# Patient Record
Sex: MALE | Race: BLACK OR AFRICAN AMERICAN
[De-identification: names, ages, dates, MRNs, and addresses within clinical notes are randomized per-mention and may not be internally consistent; named-entity substitution may affect disease eponyms.]

---

## 2019-12-19 ENCOUNTER — HOSPITAL ENCOUNTER (INPATIENT)
Dept: HOSPITAL 74 - YASAS | Age: 59
LOS: 4 days | Discharge: OTHER INSTITUTION | DRG: 897 | End: 2019-12-23
Attending: ALLERGY & IMMUNOLOGY | Admitting: ALLERGY & IMMUNOLOGY
Payer: COMMERCIAL

## 2019-12-19 VITALS — BODY MASS INDEX: 28.5 KG/M2

## 2019-12-19 DIAGNOSIS — F17.213: ICD-10-CM

## 2019-12-19 DIAGNOSIS — Z87.438: ICD-10-CM

## 2019-12-19 DIAGNOSIS — I10: ICD-10-CM

## 2019-12-19 DIAGNOSIS — J45.909: ICD-10-CM

## 2019-12-19 DIAGNOSIS — Z59.0: ICD-10-CM

## 2019-12-19 DIAGNOSIS — F32.9: ICD-10-CM

## 2019-12-19 DIAGNOSIS — F14.20: ICD-10-CM

## 2019-12-19 DIAGNOSIS — F19.24: ICD-10-CM

## 2019-12-19 DIAGNOSIS — F12.20: ICD-10-CM

## 2019-12-19 DIAGNOSIS — F10.230: Primary | ICD-10-CM

## 2019-12-19 DIAGNOSIS — Z91.018: ICD-10-CM

## 2019-12-19 DIAGNOSIS — Z91.19: ICD-10-CM

## 2019-12-19 DIAGNOSIS — Z62.810: ICD-10-CM

## 2019-12-19 DIAGNOSIS — Z86.11: ICD-10-CM

## 2019-12-19 PROCEDURE — HZ2ZZZZ DETOXIFICATION SERVICES FOR SUBSTANCE ABUSE TREATMENT: ICD-10-PCS | Performed by: ALLERGY & IMMUNOLOGY

## 2019-12-19 RX ADMIN — Medication SCH MG: at 22:00

## 2019-12-19 RX ADMIN — Medication PRN MG: at 23:16

## 2019-12-19 SDOH — ECONOMIC STABILITY - HOUSING INSECURITY: HOMELESSNESS: Z59.0

## 2019-12-20 LAB
ALBUMIN SERPL-MCNC: 3.1 G/DL (ref 3.4–5)
ALP SERPL-CCNC: 66 U/L (ref 45–117)
ALT SERPL-CCNC: 20 U/L (ref 13–61)
ANION GAP SERPL CALC-SCNC: 6 MMOL/L (ref 8–16)
AST SERPL-CCNC: 29 U/L (ref 15–37)
BILIRUB SERPL-MCNC: 0.3 MG/DL (ref 0.2–1)
BUN SERPL-MCNC: 19.1 MG/DL (ref 7–18)
CALCIUM SERPL-MCNC: 8.4 MG/DL (ref 8.5–10.1)
CHLORIDE SERPL-SCNC: 107 MMOL/L (ref 98–107)
CO2 SERPL-SCNC: 29 MMOL/L (ref 21–32)
CREAT SERPL-MCNC: 1.2 MG/DL (ref 0.55–1.3)
DEPRECATED RDW RBC AUTO: 14.9 % (ref 11.9–15.9)
GLUCOSE SERPL-MCNC: 77 MG/DL (ref 74–106)
HCT VFR BLD CALC: 38.9 % (ref 35.4–49)
HGB BLD-MCNC: 12.6 GM/DL (ref 11.7–16.9)
MCH RBC QN AUTO: 30.2 PG (ref 25.7–33.7)
MCHC RBC AUTO-ENTMCNC: 32.3 G/DL (ref 32–35.9)
MCV RBC: 93.4 FL (ref 80–96)
PLATELET # BLD AUTO: 234 K/MM3 (ref 134–434)
PMV BLD: 9.3 FL (ref 7.5–11.1)
POTASSIUM SERPLBLD-SCNC: 4 MMOL/L (ref 3.5–5.1)
PROT SERPL-MCNC: 5.8 G/DL (ref 6.4–8.2)
RBC # BLD AUTO: 4.16 M/MM3 (ref 4–5.6)
SODIUM SERPL-SCNC: 142 MMOL/L (ref 136–145)
WBC # BLD AUTO: 4.8 K/MM3 (ref 4–10)

## 2019-12-20 RX ADMIN — NICOTINE SCH: 14 PATCH, EXTENDED RELEASE TRANSDERMAL at 10:16

## 2019-12-20 RX ADMIN — Medication SCH TAB: at 10:16

## 2019-12-20 RX ADMIN — DIVALPROEX SODIUM SCH MG: 500 TABLET, DELAYED RELEASE ORAL at 22:44

## 2019-12-20 RX ADMIN — AMLODIPINE BESYLATE SCH MG: 10 TABLET ORAL at 10:16

## 2019-12-20 RX ADMIN — Medication SCH MG: at 22:44

## 2019-12-21 RX ADMIN — AMLODIPINE BESYLATE SCH MG: 10 TABLET ORAL at 10:42

## 2019-12-21 RX ADMIN — NICOTINE SCH: 14 PATCH, EXTENDED RELEASE TRANSDERMAL at 10:44

## 2019-12-21 RX ADMIN — Medication SCH MG: at 23:14

## 2019-12-21 RX ADMIN — DIVALPROEX SODIUM SCH MG: 500 TABLET, DELAYED RELEASE ORAL at 10:42

## 2019-12-21 RX ADMIN — Medication SCH TAB: at 10:43

## 2019-12-21 RX ADMIN — Medication PRN MG: at 23:14

## 2019-12-21 RX ADMIN — DIVALPROEX SODIUM SCH MG: 500 TABLET, DELAYED RELEASE ORAL at 23:14

## 2019-12-22 RX ADMIN — DIVALPROEX SODIUM SCH MG: 500 TABLET, DELAYED RELEASE ORAL at 10:50

## 2019-12-22 RX ADMIN — NICOTINE SCH: 14 PATCH, EXTENDED RELEASE TRANSDERMAL at 10:51

## 2019-12-22 RX ADMIN — Medication SCH TAB: at 10:50

## 2019-12-22 RX ADMIN — Medication PRN MG: at 22:28

## 2019-12-22 RX ADMIN — AMLODIPINE BESYLATE SCH MG: 10 TABLET ORAL at 10:50

## 2019-12-22 RX ADMIN — Medication SCH MG: at 22:28

## 2019-12-22 RX ADMIN — DIVALPROEX SODIUM SCH MG: 500 TABLET, DELAYED RELEASE ORAL at 22:28

## 2019-12-23 ENCOUNTER — HOSPITAL ENCOUNTER (INPATIENT)
Dept: HOSPITAL 74 - YASAS | Age: 59
LOS: 10 days | Discharge: HOME | DRG: 895 | End: 2020-01-02
Attending: NEUROMUSCULOSKELETAL MEDICINE & OMM | Admitting: NEUROMUSCULOSKELETAL MEDICINE & OMM
Payer: COMMERCIAL

## 2019-12-23 VITALS — HEART RATE: 98 BPM | TEMPERATURE: 97 F | DIASTOLIC BLOOD PRESSURE: 87 MMHG | SYSTOLIC BLOOD PRESSURE: 131 MMHG

## 2019-12-23 DIAGNOSIS — Z86.11: ICD-10-CM

## 2019-12-23 DIAGNOSIS — Z59.0: ICD-10-CM

## 2019-12-23 DIAGNOSIS — I10: ICD-10-CM

## 2019-12-23 DIAGNOSIS — F17.213: ICD-10-CM

## 2019-12-23 DIAGNOSIS — J45.909: ICD-10-CM

## 2019-12-23 DIAGNOSIS — Z87.438: ICD-10-CM

## 2019-12-23 DIAGNOSIS — F12.20: ICD-10-CM

## 2019-12-23 DIAGNOSIS — F14.20: Primary | ICD-10-CM

## 2019-12-23 PROCEDURE — HZ42ZZZ GROUP COUNSELING FOR SUBSTANCE ABUSE TREATMENT, COGNITIVE-BEHAVIORAL: ICD-10-PCS | Performed by: ALLERGY & IMMUNOLOGY

## 2019-12-23 RX ADMIN — DIVALPROEX SODIUM SCH MG: 500 TABLET, DELAYED RELEASE ORAL at 21:12

## 2019-12-23 RX ADMIN — Medication PRN MG: at 21:11

## 2019-12-23 RX ADMIN — Medication PRN APPLIC: at 18:17

## 2019-12-23 RX ADMIN — Medication SCH MG: at 21:11

## 2019-12-23 RX ADMIN — NICOTINE SCH: 14 PATCH, EXTENDED RELEASE TRANSDERMAL at 09:35

## 2019-12-23 RX ADMIN — AMLODIPINE BESYLATE SCH MG: 10 TABLET ORAL at 09:34

## 2019-12-23 RX ADMIN — Medication SCH TAB: at 09:34

## 2019-12-23 RX ADMIN — DIVALPROEX SODIUM SCH MG: 500 TABLET, DELAYED RELEASE ORAL at 09:34

## 2019-12-23 SDOH — ECONOMIC STABILITY - HOUSING INSECURITY: HOMELESSNESS: Z59.0

## 2019-12-24 RX ADMIN — DIVALPROEX SODIUM SCH MG: 500 TABLET, DELAYED RELEASE ORAL at 21:02

## 2019-12-24 RX ADMIN — Medication SCH MG: at 21:02

## 2019-12-24 RX ADMIN — Medication PRN MG: at 21:02

## 2019-12-24 RX ADMIN — NICOTINE SCH: 14 PATCH, EXTENDED RELEASE TRANSDERMAL at 09:27

## 2019-12-24 RX ADMIN — AMLODIPINE BESYLATE SCH MG: 10 TABLET ORAL at 09:26

## 2019-12-24 RX ADMIN — Medication SCH TAB: at 09:26

## 2019-12-24 RX ADMIN — DIVALPROEX SODIUM SCH MG: 500 TABLET, DELAYED RELEASE ORAL at 09:26

## 2019-12-25 RX ADMIN — Medication SCH TAB: at 09:56

## 2019-12-25 RX ADMIN — DIVALPROEX SODIUM SCH MG: 500 TABLET, DELAYED RELEASE ORAL at 09:56

## 2019-12-25 RX ADMIN — AMLODIPINE BESYLATE SCH MG: 10 TABLET ORAL at 09:56

## 2019-12-25 RX ADMIN — Medication PRN MG: at 21:24

## 2019-12-25 RX ADMIN — DIVALPROEX SODIUM SCH MG: 500 TABLET, DELAYED RELEASE ORAL at 21:24

## 2019-12-25 RX ADMIN — NICOTINE SCH: 14 PATCH, EXTENDED RELEASE TRANSDERMAL at 09:56

## 2019-12-25 RX ADMIN — Medication SCH MG: at 21:24

## 2019-12-26 RX ADMIN — DIVALPROEX SODIUM SCH MG: 500 TABLET, DELAYED RELEASE ORAL at 09:28

## 2019-12-26 RX ADMIN — AMLODIPINE BESYLATE SCH MG: 10 TABLET ORAL at 09:28

## 2019-12-26 RX ADMIN — NICOTINE SCH: 14 PATCH, EXTENDED RELEASE TRANSDERMAL at 09:28

## 2019-12-26 RX ADMIN — Medication PRN MG: at 21:28

## 2019-12-26 RX ADMIN — Medication SCH MG: at 21:28

## 2019-12-26 RX ADMIN — Medication SCH TAB: at 09:28

## 2019-12-26 RX ADMIN — DIVALPROEX SODIUM SCH MG: 500 TABLET, DELAYED RELEASE ORAL at 21:28

## 2019-12-27 RX ADMIN — DIVALPROEX SODIUM SCH MG: 500 TABLET, DELAYED RELEASE ORAL at 10:04

## 2019-12-27 RX ADMIN — NICOTINE SCH: 14 PATCH, EXTENDED RELEASE TRANSDERMAL at 10:05

## 2019-12-27 RX ADMIN — Medication SCH: at 21:26

## 2019-12-27 RX ADMIN — AMLODIPINE BESYLATE SCH MG: 10 TABLET ORAL at 10:04

## 2019-12-27 RX ADMIN — DIVALPROEX SODIUM SCH: 500 TABLET, DELAYED RELEASE ORAL at 21:26

## 2019-12-27 RX ADMIN — Medication SCH TAB: at 10:04

## 2019-12-28 RX ADMIN — DIVALPROEX SODIUM SCH MG: 500 TABLET, DELAYED RELEASE ORAL at 09:40

## 2019-12-28 RX ADMIN — Medication SCH TAB: at 09:40

## 2019-12-28 RX ADMIN — Medication SCH MG: at 21:06

## 2019-12-28 RX ADMIN — AMLODIPINE BESYLATE SCH MG: 10 TABLET ORAL at 09:39

## 2019-12-28 RX ADMIN — DIVALPROEX SODIUM SCH MG: 500 TABLET, DELAYED RELEASE ORAL at 21:06

## 2019-12-28 RX ADMIN — Medication PRN MG: at 21:06

## 2019-12-28 RX ADMIN — NICOTINE SCH: 14 PATCH, EXTENDED RELEASE TRANSDERMAL at 09:39

## 2019-12-29 RX ADMIN — Medication SCH TAB: at 09:30

## 2019-12-29 RX ADMIN — Medication SCH MG: at 21:41

## 2019-12-29 RX ADMIN — DIVALPROEX SODIUM SCH MG: 500 TABLET, DELAYED RELEASE ORAL at 09:30

## 2019-12-29 RX ADMIN — DIVALPROEX SODIUM SCH MG: 500 TABLET, DELAYED RELEASE ORAL at 21:41

## 2019-12-29 RX ADMIN — NICOTINE SCH: 14 PATCH, EXTENDED RELEASE TRANSDERMAL at 09:30

## 2019-12-29 RX ADMIN — AMLODIPINE BESYLATE SCH MG: 10 TABLET ORAL at 09:30

## 2019-12-29 RX ADMIN — Medication PRN MG: at 21:41

## 2019-12-30 RX ADMIN — Medication PRN APPLIC: at 07:08

## 2019-12-30 RX ADMIN — AMLODIPINE BESYLATE SCH MG: 10 TABLET ORAL at 09:33

## 2019-12-30 RX ADMIN — DIVALPROEX SODIUM SCH MG: 500 TABLET, DELAYED RELEASE ORAL at 09:33

## 2019-12-30 RX ADMIN — NICOTINE SCH: 14 PATCH, EXTENDED RELEASE TRANSDERMAL at 09:34

## 2019-12-30 RX ADMIN — Medication SCH TAB: at 09:33

## 2019-12-30 RX ADMIN — DIVALPROEX SODIUM SCH MG: 500 TABLET, DELAYED RELEASE ORAL at 21:39

## 2019-12-30 RX ADMIN — Medication SCH MG: at 21:39

## 2019-12-30 RX ADMIN — Medication PRN MG: at 21:39

## 2019-12-31 RX ADMIN — NICOTINE SCH: 14 PATCH, EXTENDED RELEASE TRANSDERMAL at 09:41

## 2019-12-31 RX ADMIN — Medication SCH TAB: at 09:41

## 2019-12-31 RX ADMIN — DIVALPROEX SODIUM SCH MG: 500 TABLET, DELAYED RELEASE ORAL at 21:32

## 2019-12-31 RX ADMIN — Medication SCH MG: at 21:32

## 2019-12-31 RX ADMIN — Medication PRN MG: at 21:32

## 2019-12-31 RX ADMIN — AMLODIPINE BESYLATE SCH MG: 10 TABLET ORAL at 09:41

## 2019-12-31 RX ADMIN — DIVALPROEX SODIUM SCH MG: 500 TABLET, DELAYED RELEASE ORAL at 09:41

## 2020-01-01 RX ADMIN — Medication SCH: at 22:02

## 2020-01-01 RX ADMIN — Medication SCH TAB: at 09:36

## 2020-01-01 RX ADMIN — DIVALPROEX SODIUM SCH MG: 500 TABLET, DELAYED RELEASE ORAL at 09:36

## 2020-01-01 RX ADMIN — AMLODIPINE BESYLATE SCH MG: 10 TABLET ORAL at 09:36

## 2020-01-01 RX ADMIN — NICOTINE SCH: 14 PATCH, EXTENDED RELEASE TRANSDERMAL at 09:36

## 2020-01-01 RX ADMIN — DIVALPROEX SODIUM SCH: 500 TABLET, DELAYED RELEASE ORAL at 22:02

## 2020-01-02 VITALS — SYSTOLIC BLOOD PRESSURE: 126 MMHG | HEART RATE: 72 BPM | DIASTOLIC BLOOD PRESSURE: 85 MMHG

## 2020-01-02 VITALS — TEMPERATURE: 97.7 F

## 2022-06-10 ENCOUNTER — EMERGENCY (EMERGENCY)
Facility: HOSPITAL | Age: 62
LOS: 0 days | Discharge: HOME | End: 2022-06-10
Attending: EMERGENCY MEDICINE | Admitting: EMERGENCY MEDICINE
Payer: COMMERCIAL

## 2022-06-10 VITALS
OXYGEN SATURATION: 99 % | DIASTOLIC BLOOD PRESSURE: 78 MMHG | SYSTOLIC BLOOD PRESSURE: 132 MMHG | RESPIRATION RATE: 18 BRPM | HEART RATE: 72 BPM

## 2022-06-10 VITALS
OXYGEN SATURATION: 98 % | TEMPERATURE: 98 F | DIASTOLIC BLOOD PRESSURE: 94 MMHG | SYSTOLIC BLOOD PRESSURE: 134 MMHG | HEART RATE: 78 BPM
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
## 2022-06-10 DIAGNOSIS — M79.89 OTHER SPECIFIED SOFT TISSUE DISORDERS: ICD-10-CM

## 2022-06-10 DIAGNOSIS — F31.9 BIPOLAR DISORDER, UNSPECIFIED: ICD-10-CM

## 2022-06-10 DIAGNOSIS — S93.401A SPRAIN OF UNSPECIFIED LIGAMENT OF RIGHT ANKLE, INITIAL ENCOUNTER: ICD-10-CM
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
## 2022-06-10 DIAGNOSIS — F14.99 COCAINE USE, UNSPECIFIED WITH UNSPECIFIED COCAINE-INDUCED DISORDER: ICD-10-CM

## 2022-06-10 DIAGNOSIS — M79.10 MYALGIA, UNSPECIFIED SITE: ICD-10-CM

## 2022-06-10 DIAGNOSIS — I10 ESSENTIAL (PRIMARY) HYPERTENSION: ICD-10-CM

## 2022-06-10 LAB — D DIMER BLD IA.RAPID-MCNC: 383 NG/ML DDU — HIGH (ref 0–230)

## 2022-06-10 PROCEDURE — 73610 X-RAY EXAM OF ANKLE: CPT | Mod: 26,RT

## 2022-06-10 PROCEDURE — 99284 EMERGENCY DEPT VISIT MOD MDM: CPT

## 2022-06-10 PROCEDURE — 73620 X-RAY EXAM OF FOOT: CPT | Mod: 26,RT

## 2022-06-10 PROCEDURE — 93971 EXTREMITY STUDY: CPT | Mod: 26,RT

## 2022-06-10 NOTE — ED PROVIDER NOTE - NS ED ROS FT
Constitutional: (-) fever  Cardiovascular: (-) chest pain, (-) syncope  Respiratory: (-) cough, (-) shortness of breath  Gastrointestinal: (-) vomiting, (-) abdominal pain  Musculoskeletal: (-) back pain, + pain as discussed  Integumentary: (-) rash, (-) edema of foot and ankle  Neurological: (-) headache, (-) altered mental status    Except as documented in the HPI, all other systems are negative.

## 2022-06-10 NOTE — ED PROVIDER NOTE - PATIENT PORTAL LINK FT
You can access the FollowMyHealth Patient Portal offered by [facility] by registering at the following website: [email or web address]. By joining [employer]’s FollowMyHealth portal, you will also be able to view your health information using other applications (apps) compatible with our system.

## 2022-06-10 NOTE — ED PROVIDER NOTE - PROGRESS NOTE DETAILS
Per vascular lab pt DVT study is negative. Pt educated on results. Stable for davon Sethi with strain. Per vascular lab pt DVT study is negative. Pt educated on results and to follow up with orthopedics if sx persist. Stable for davon Sethi with strain.

## 2022-06-10 NOTE — ED PROVIDER NOTE - PHYSICAL EXAMINATION
VITAL SIGNS: I have reviewed    CONSTITUTIONAL: Well-developed; well-nourished; in no acute distress.  SKIN: Skin exam is warm and dry, no acute rash.  HEAD: Normocephalic; atraumatic.  EYES:   EOM intact;  sclera clear.  ENT: No nasal discharge; airway clear.    CARD: S1, S2 normal; no murmurs, gallops, or rubs. Regular rate and rhythm.  RESP: No wheezes, rales or rhonchi.  EXT: Normal ROM. No clubbing, + mild swelling of dorsum of R foot and around the ankle with mild diffue TTP, no skin changes 2+ DP and PT pulses  NEURO: Alert, oriented. Grossly unremarkable. No focal deficits.  PSYCH: Cooperative, appropriate.

## 2022-06-10 NOTE — ED PROVIDER NOTE - NSFOLLOWUPCLINICS_GEN_ALL_ED_FT
[facility]  Orthpedic  [address], [place]   Phone: [phone]  Fax:   Follow Up Time: 4-6 Days

## 2022-06-10 NOTE — ED PROVIDER NOTE - OBJECTIVE STATEMENT
62 yo M resident at [facility], with hx of Bipolar d/o and HTN, on Depakote and Norvasc without recent change in medication, was sitting smoking crack cocaine and drinking last Friday when he noticed his R foot/ankle swelled up and was painful. Unsure if there was trauma but doesn't think so. Says over the week the swelling got much better but then got worse today. No fever, CP, SOB, constitutional sx, abdominal or back pain or GI sx. No neuro sx.

## 2023-08-07 ENCOUNTER — HOSPITAL ENCOUNTER (INPATIENT)
Dept: HOSPITAL 74 - YASAS | Age: 63
LOS: 5 days | Discharge: OTHER INSTITUTION | DRG: 897 | End: 2023-08-12
Attending: ALLERGY & IMMUNOLOGY | Admitting: ALLERGY & IMMUNOLOGY
Payer: COMMERCIAL

## 2023-08-07 VITALS — BODY MASS INDEX: 31.4 KG/M2

## 2023-08-07 DIAGNOSIS — F10.230: Primary | ICD-10-CM

## 2023-08-07 DIAGNOSIS — Z62.810: ICD-10-CM

## 2023-08-07 DIAGNOSIS — G89.29: ICD-10-CM

## 2023-08-07 DIAGNOSIS — F19.24: ICD-10-CM

## 2023-08-07 DIAGNOSIS — Z86.69: ICD-10-CM

## 2023-08-07 DIAGNOSIS — Z86.11: ICD-10-CM

## 2023-08-07 DIAGNOSIS — J45.909: ICD-10-CM

## 2023-08-07 DIAGNOSIS — M54.50: ICD-10-CM

## 2023-08-07 DIAGNOSIS — F19.282: ICD-10-CM

## 2023-08-07 DIAGNOSIS — F31.9: ICD-10-CM

## 2023-08-07 DIAGNOSIS — F17.213: ICD-10-CM

## 2023-08-07 DIAGNOSIS — Z91.199: ICD-10-CM

## 2023-08-07 DIAGNOSIS — Z86.19: ICD-10-CM

## 2023-08-07 DIAGNOSIS — I10: ICD-10-CM

## 2023-08-07 DIAGNOSIS — F14.20: ICD-10-CM

## 2023-08-07 PROCEDURE — HZ2ZZZZ DETOXIFICATION SERVICES FOR SUBSTANCE ABUSE TREATMENT: ICD-10-PCS | Performed by: ALLERGY & IMMUNOLOGY

## 2023-08-07 RX ADMIN — DIVALPROEX SODIUM SCH MG: 500 TABLET, DELAYED RELEASE ORAL at 21:27

## 2023-08-07 RX ADMIN — Medication SCH MG: at 21:27

## 2023-08-08 LAB
ALBUMIN SERPL-MCNC: 3.4 G/DL (ref 3.4–5)
ALP SERPL-CCNC: 84 U/L (ref 45–117)
ALT SERPL-CCNC: 26 U/L (ref 13–61)
ANION GAP SERPL CALC-SCNC: 4 MMOL/L (ref 8–16)
AST SERPL-CCNC: 21 U/L (ref 15–37)
BILIRUB SERPL-MCNC: 0.2 MG/DL (ref 0.2–1)
BUN SERPL-MCNC: 15.5 MG/DL (ref 7–18)
CALCIUM SERPL-MCNC: 9 MG/DL (ref 8.5–10.1)
CHLORIDE SERPL-SCNC: 108 MMOL/L (ref 98–107)
CO2 SERPL-SCNC: 32 MMOL/L (ref 21–32)
CREAT SERPL-MCNC: 1.2 MG/DL (ref 0.55–1.3)
DEPRECATED RDW RBC AUTO: 14.2 % (ref 11.9–15.9)
GLUCOSE SERPL-MCNC: 62 MG/DL (ref 74–106)
HCT VFR BLD CALC: 41.6 % (ref 35.4–49)
HGB BLD-MCNC: 13.9 GM/DL (ref 11.7–16.9)
MCH RBC QN AUTO: 30.7 PG (ref 25.7–33.7)
MCHC RBC AUTO-ENTMCNC: 33.3 G/DL (ref 32–35.9)
MCV RBC: 92.2 FL (ref 80–96)
PLATELET # BLD AUTO: 263 10^3/UL (ref 134–434)
PMV BLD: 9.1 FL (ref 7.5–11.1)
POTASSIUM SERPLBLD-SCNC: 4.1 MMOL/L (ref 3.5–5.1)
PROT SERPL-MCNC: 6.4 G/DL (ref 6.4–8.2)
RBC # BLD AUTO: 4.52 M/MM3 (ref 4–5.6)
SODIUM SERPL-SCNC: 145 MMOL/L (ref 136–145)
WBC # BLD AUTO: 5.6 K/MM3 (ref 4–10)

## 2023-08-08 RX ADMIN — Medication SCH: at 22:52

## 2023-08-08 RX ADMIN — DIVALPROEX SODIUM SCH MG: 500 TABLET, DELAYED RELEASE ORAL at 10:38

## 2023-08-08 RX ADMIN — AMLODIPINE BESYLATE SCH MG: 10 TABLET ORAL at 10:39

## 2023-08-08 RX ADMIN — DIVALPROEX SODIUM SCH MG: 500 TABLET, DELAYED RELEASE ORAL at 22:49

## 2023-08-08 RX ADMIN — Medication SCH MG: at 22:49

## 2023-08-08 RX ADMIN — Medication SCH TAB: at 10:38

## 2023-08-09 RX ADMIN — DIVALPROEX SODIUM SCH MG: 500 TABLET, DELAYED RELEASE ORAL at 10:52

## 2023-08-09 RX ADMIN — DIVALPROEX SODIUM SCH MG: 500 TABLET, DELAYED RELEASE ORAL at 22:31

## 2023-08-09 RX ADMIN — METHOCARBAMOL PRN MG: 500 TABLET ORAL at 10:52

## 2023-08-09 RX ADMIN — Medication SCH TAB: at 10:52

## 2023-08-09 RX ADMIN — AMLODIPINE BESYLATE SCH MG: 10 TABLET ORAL at 10:52

## 2023-08-09 RX ADMIN — Medication SCH MG: at 22:31

## 2023-08-09 RX ADMIN — Medication SCH: at 22:32

## 2023-08-10 RX ADMIN — AMLODIPINE BESYLATE SCH MG: 10 TABLET ORAL at 10:34

## 2023-08-10 RX ADMIN — Medication SCH: at 23:39

## 2023-08-10 RX ADMIN — Medication SCH TAB: at 10:33

## 2023-08-10 RX ADMIN — DIVALPROEX SODIUM SCH: 500 TABLET, DELAYED RELEASE ORAL at 23:38

## 2023-08-10 RX ADMIN — DIVALPROEX SODIUM SCH MG: 500 TABLET, DELAYED RELEASE ORAL at 10:34

## 2023-08-10 RX ADMIN — METHOCARBAMOL PRN MG: 500 TABLET ORAL at 10:34

## 2023-08-10 RX ADMIN — Medication SCH: at 23:38

## 2023-08-11 RX ADMIN — METHOCARBAMOL PRN MG: 500 TABLET ORAL at 22:56

## 2023-08-11 RX ADMIN — Medication SCH MG: at 22:56

## 2023-08-11 RX ADMIN — AMLODIPINE BESYLATE SCH MG: 10 TABLET ORAL at 11:18

## 2023-08-11 RX ADMIN — Medication SCH TAB: at 11:18

## 2023-08-11 RX ADMIN — DIVALPROEX SODIUM SCH MG: 500 TABLET, DELAYED RELEASE ORAL at 22:56

## 2023-08-11 RX ADMIN — DIVALPROEX SODIUM SCH MG: 500 TABLET, DELAYED RELEASE ORAL at 11:18

## 2023-08-12 ENCOUNTER — HOSPITAL ENCOUNTER (INPATIENT)
Dept: HOSPITAL 74 - YASAS | Age: 63
LOS: 13 days | Discharge: HOME | DRG: 895 | End: 2023-08-25
Attending: PSYCHIATRY & NEUROLOGY | Admitting: ALLERGY & IMMUNOLOGY
Payer: COMMERCIAL

## 2023-08-12 VITALS
TEMPERATURE: 97.8 F | RESPIRATION RATE: 17 BRPM | HEART RATE: 74 BPM | DIASTOLIC BLOOD PRESSURE: 105 MMHG | SYSTOLIC BLOOD PRESSURE: 146 MMHG

## 2023-08-12 DIAGNOSIS — F41.9: ICD-10-CM

## 2023-08-12 DIAGNOSIS — I10: ICD-10-CM

## 2023-08-12 DIAGNOSIS — F31.9: ICD-10-CM

## 2023-08-12 DIAGNOSIS — F10.20: Primary | ICD-10-CM

## 2023-08-12 DIAGNOSIS — F14.20: ICD-10-CM

## 2023-08-12 DIAGNOSIS — F17.210: ICD-10-CM

## 2023-08-12 DIAGNOSIS — F19.282: ICD-10-CM

## 2023-08-12 DIAGNOSIS — G89.29: ICD-10-CM

## 2023-08-12 DIAGNOSIS — J45.909: ICD-10-CM

## 2023-08-12 DIAGNOSIS — M54.50: ICD-10-CM

## 2023-08-12 DIAGNOSIS — F19.24: ICD-10-CM

## 2023-08-12 PROCEDURE — HZ42ZZZ GROUP COUNSELING FOR SUBSTANCE ABUSE TREATMENT, COGNITIVE-BEHAVIORAL: ICD-10-PCS | Performed by: PSYCHIATRY & NEUROLOGY

## 2023-08-12 RX ADMIN — Medication SCH TAB: at 10:29

## 2023-08-12 RX ADMIN — DIVALPROEX SODIUM SCH MG: 500 TABLET, DELAYED RELEASE ORAL at 21:14

## 2023-08-12 RX ADMIN — DIVALPROEX SODIUM SCH MG: 500 TABLET, DELAYED RELEASE ORAL at 10:29

## 2023-08-12 RX ADMIN — Medication SCH MG: at 21:14

## 2023-08-12 RX ADMIN — AMLODIPINE BESYLATE SCH MG: 10 TABLET ORAL at 10:29

## 2023-08-13 RX ADMIN — Medication SCH MG: at 21:41

## 2023-08-13 RX ADMIN — DIVALPROEX SODIUM SCH MG: 500 TABLET, DELAYED RELEASE ORAL at 10:11

## 2023-08-13 RX ADMIN — AMLODIPINE BESYLATE SCH MG: 10 TABLET ORAL at 10:11

## 2023-08-13 RX ADMIN — Medication PRN BAR: at 06:02

## 2023-08-13 RX ADMIN — Medication SCH TAB: at 10:11

## 2023-08-13 RX ADMIN — HYDROXYZINE PAMOATE PRN MG: 25 CAPSULE ORAL at 06:01

## 2023-08-13 RX ADMIN — DIVALPROEX SODIUM SCH MG: 500 TABLET, DELAYED RELEASE ORAL at 21:40

## 2023-08-14 RX ADMIN — DIVALPROEX SODIUM SCH MG: 500 TABLET, DELAYED RELEASE ORAL at 10:21

## 2023-08-14 RX ADMIN — Medication SCH MG: at 21:25

## 2023-08-14 RX ADMIN — AMLODIPINE BESYLATE SCH MG: 10 TABLET ORAL at 10:23

## 2023-08-14 RX ADMIN — HYDROXYZINE PAMOATE PRN MG: 25 CAPSULE ORAL at 06:12

## 2023-08-14 RX ADMIN — Medication SCH TAB: at 10:21

## 2023-08-14 RX ADMIN — DIVALPROEX SODIUM SCH MG: 500 TABLET, DELAYED RELEASE ORAL at 21:25

## 2023-08-15 RX ADMIN — Medication SCH TAB: at 09:54

## 2023-08-15 RX ADMIN — Medication SCH MG: at 21:07

## 2023-08-15 RX ADMIN — DIVALPROEX SODIUM SCH MG: 500 TABLET, DELAYED RELEASE ORAL at 21:07

## 2023-08-15 RX ADMIN — DIVALPROEX SODIUM SCH MG: 500 TABLET, DELAYED RELEASE ORAL at 09:54

## 2023-08-15 RX ADMIN — AMLODIPINE BESYLATE SCH MG: 10 TABLET ORAL at 09:54

## 2023-08-16 RX ADMIN — DIVALPROEX SODIUM SCH MG: 500 TABLET, DELAYED RELEASE ORAL at 21:24

## 2023-08-16 RX ADMIN — Medication SCH MG: at 21:24

## 2023-08-16 RX ADMIN — DIVALPROEX SODIUM SCH MG: 500 TABLET, DELAYED RELEASE ORAL at 09:40

## 2023-08-16 RX ADMIN — HYDROXYZINE PAMOATE PRN MG: 25 CAPSULE ORAL at 06:00

## 2023-08-16 RX ADMIN — Medication SCH TAB: at 09:40

## 2023-08-16 RX ADMIN — AMLODIPINE BESYLATE SCH MG: 10 TABLET ORAL at 09:40

## 2023-08-17 RX ADMIN — DIVALPROEX SODIUM SCH MG: 500 TABLET, DELAYED RELEASE ORAL at 21:18

## 2023-08-17 RX ADMIN — Medication SCH MG: at 21:18

## 2023-08-17 RX ADMIN — Medication SCH TAB: at 09:56

## 2023-08-17 RX ADMIN — Medication PRN BAR: at 09:56

## 2023-08-17 RX ADMIN — AMLODIPINE BESYLATE SCH MG: 10 TABLET ORAL at 09:56

## 2023-08-17 RX ADMIN — DIVALPROEX SODIUM SCH MG: 500 TABLET, DELAYED RELEASE ORAL at 09:56

## 2023-08-18 RX ADMIN — Medication SCH MG: at 21:29

## 2023-08-18 RX ADMIN — DIVALPROEX SODIUM SCH MG: 500 TABLET, DELAYED RELEASE ORAL at 09:39

## 2023-08-18 RX ADMIN — DIVALPROEX SODIUM SCH MG: 500 TABLET, DELAYED RELEASE ORAL at 21:30

## 2023-08-18 RX ADMIN — AMLODIPINE BESYLATE SCH MG: 10 TABLET ORAL at 09:39

## 2023-08-18 RX ADMIN — Medication SCH TAB: at 09:39

## 2023-08-18 RX ADMIN — Medication SCH MG: at 21:30

## 2023-08-19 RX ADMIN — Medication SCH MG: at 21:06

## 2023-08-19 RX ADMIN — Medication SCH TAB: at 09:44

## 2023-08-19 RX ADMIN — AMLODIPINE BESYLATE SCH MG: 10 TABLET ORAL at 09:44

## 2023-08-19 RX ADMIN — DIVALPROEX SODIUM SCH MG: 500 TABLET, DELAYED RELEASE ORAL at 09:44

## 2023-08-19 RX ADMIN — DIVALPROEX SODIUM SCH MG: 500 TABLET, DELAYED RELEASE ORAL at 21:06

## 2023-08-19 RX ADMIN — HYDROXYZINE PAMOATE PRN MG: 25 CAPSULE ORAL at 21:06

## 2023-08-19 RX ADMIN — HYDROXYZINE PAMOATE PRN MG: 25 CAPSULE ORAL at 05:57

## 2023-08-20 RX ADMIN — HYDROXYZINE PAMOATE PRN MG: 25 CAPSULE ORAL at 06:03

## 2023-08-20 RX ADMIN — Medication SCH MG: at 21:28

## 2023-08-20 RX ADMIN — Medication SCH TAB: at 09:25

## 2023-08-20 RX ADMIN — AMLODIPINE BESYLATE SCH MG: 10 TABLET ORAL at 09:25

## 2023-08-20 RX ADMIN — DIVALPROEX SODIUM SCH MG: 500 TABLET, DELAYED RELEASE ORAL at 09:25

## 2023-08-20 RX ADMIN — DIVALPROEX SODIUM SCH MG: 500 TABLET, DELAYED RELEASE ORAL at 21:28

## 2023-08-21 RX ADMIN — Medication SCH TAB: at 09:40

## 2023-08-21 RX ADMIN — Medication SCH MG: at 21:30

## 2023-08-21 RX ADMIN — DIVALPROEX SODIUM SCH MG: 500 TABLET, DELAYED RELEASE ORAL at 21:29

## 2023-08-21 RX ADMIN — DIVALPROEX SODIUM SCH MG: 500 TABLET, DELAYED RELEASE ORAL at 09:39

## 2023-08-21 RX ADMIN — AMLODIPINE BESYLATE SCH MG: 10 TABLET ORAL at 09:40

## 2023-08-22 RX ADMIN — Medication SCH MG: at 21:02

## 2023-08-22 RX ADMIN — AMLODIPINE BESYLATE SCH MG: 10 TABLET ORAL at 09:54

## 2023-08-22 RX ADMIN — HYDROXYZINE PAMOATE PRN MG: 25 CAPSULE ORAL at 21:02

## 2023-08-22 RX ADMIN — DIVALPROEX SODIUM SCH MG: 500 TABLET, DELAYED RELEASE ORAL at 09:54

## 2023-08-22 RX ADMIN — HYDROXYZINE PAMOATE PRN MG: 25 CAPSULE ORAL at 06:01

## 2023-08-22 RX ADMIN — Medication SCH TAB: at 09:54

## 2023-08-22 RX ADMIN — DIVALPROEX SODIUM SCH MG: 500 TABLET, DELAYED RELEASE ORAL at 21:02

## 2023-08-23 VITALS — RESPIRATION RATE: 18 BRPM

## 2023-08-23 RX ADMIN — Medication SCH MG: at 21:19

## 2023-08-23 RX ADMIN — Medication PRN BAR: at 06:44

## 2023-08-23 RX ADMIN — DIVALPROEX SODIUM SCH MG: 500 TABLET, DELAYED RELEASE ORAL at 21:20

## 2023-08-23 RX ADMIN — AMLODIPINE BESYLATE SCH MG: 10 TABLET ORAL at 09:59

## 2023-08-23 RX ADMIN — DIVALPROEX SODIUM SCH MG: 500 TABLET, DELAYED RELEASE ORAL at 09:59

## 2023-08-23 RX ADMIN — Medication SCH TAB: at 09:59

## 2023-08-24 RX ADMIN — Medication SCH MG: at 21:06

## 2023-08-24 RX ADMIN — DIVALPROEX SODIUM SCH MG: 500 TABLET, DELAYED RELEASE ORAL at 21:06

## 2023-08-24 RX ADMIN — AMLODIPINE BESYLATE SCH MG: 10 TABLET ORAL at 10:28

## 2023-08-24 RX ADMIN — Medication SCH TAB: at 10:28

## 2023-08-24 RX ADMIN — DIVALPROEX SODIUM SCH MG: 500 TABLET, DELAYED RELEASE ORAL at 10:28

## 2023-08-25 VITALS — TEMPERATURE: 97.1 F

## 2023-08-25 VITALS — HEART RATE: 90 BPM | DIASTOLIC BLOOD PRESSURE: 84 MMHG | SYSTOLIC BLOOD PRESSURE: 113 MMHG

## 2023-08-25 RX ADMIN — HYDROXYZINE PAMOATE PRN MG: 25 CAPSULE ORAL at 05:59

## 2023-08-25 RX ADMIN — Medication SCH TAB: at 09:10

## 2023-08-25 RX ADMIN — DIVALPROEX SODIUM SCH MG: 500 TABLET, DELAYED RELEASE ORAL at 09:10

## 2023-08-25 RX ADMIN — AMLODIPINE BESYLATE SCH MG: 10 TABLET ORAL at 09:10

## 2024-04-10 ENCOUNTER — HOSPITAL ENCOUNTER (INPATIENT)
Dept: HOSPITAL 74 - YASAS | Age: 64
LOS: 1 days | Discharge: OTHER INSTITUTION | DRG: 897 | End: 2024-04-11
Attending: SURGERY | Admitting: ALLERGY & IMMUNOLOGY
Payer: COMMERCIAL

## 2024-04-10 VITALS — BODY MASS INDEX: 31.4 KG/M2

## 2024-04-10 DIAGNOSIS — F17.210: ICD-10-CM

## 2024-04-10 DIAGNOSIS — I10: ICD-10-CM

## 2024-04-10 DIAGNOSIS — F19.24: ICD-10-CM

## 2024-04-10 DIAGNOSIS — Z62.810: ICD-10-CM

## 2024-04-10 DIAGNOSIS — F10.20: Primary | ICD-10-CM

## 2024-04-10 DIAGNOSIS — Z86.11: ICD-10-CM

## 2024-04-10 DIAGNOSIS — F12.20: ICD-10-CM

## 2024-04-10 DIAGNOSIS — F14.20: ICD-10-CM

## 2024-04-10 PROCEDURE — HZ2ZZZZ DETOXIFICATION SERVICES FOR SUBSTANCE ABUSE TREATMENT: ICD-10-PCS | Performed by: SURGERY

## 2024-04-10 RX ADMIN — Medication SCH MG: at 22:20

## 2024-04-10 RX ADMIN — PETROLATUM SCH APPLIC: 42 OINTMENT TOPICAL at 22:44

## 2024-04-11 ENCOUNTER — HOSPITAL ENCOUNTER (INPATIENT)
Dept: HOSPITAL 74 - YASAS | Age: 64
LOS: 19 days | Discharge: HOME | DRG: 895 | End: 2024-04-30
Attending: PSYCHIATRY & NEUROLOGY | Admitting: ALLERGY & IMMUNOLOGY
Payer: COMMERCIAL

## 2024-04-11 VITALS
TEMPERATURE: 97.1 F | SYSTOLIC BLOOD PRESSURE: 134 MMHG | HEART RATE: 73 BPM | DIASTOLIC BLOOD PRESSURE: 88 MMHG | RESPIRATION RATE: 20 BRPM

## 2024-04-11 DIAGNOSIS — F19.24: ICD-10-CM

## 2024-04-11 DIAGNOSIS — F17.210: ICD-10-CM

## 2024-04-11 DIAGNOSIS — F14.20: ICD-10-CM

## 2024-04-11 DIAGNOSIS — F10.20: Primary | ICD-10-CM

## 2024-04-11 DIAGNOSIS — Z87.09: ICD-10-CM

## 2024-04-11 DIAGNOSIS — I10: ICD-10-CM

## 2024-04-11 DIAGNOSIS — F31.9: ICD-10-CM

## 2024-04-11 DIAGNOSIS — H91.92: ICD-10-CM

## 2024-04-11 DIAGNOSIS — F19.282: ICD-10-CM

## 2024-04-11 DIAGNOSIS — F41.9: ICD-10-CM

## 2024-04-11 DIAGNOSIS — Z86.11: ICD-10-CM

## 2024-04-11 LAB
ALBUMIN SERPL-MCNC: 3.1 G/DL (ref 3.4–5)
ALP SERPL-CCNC: 74 U/L (ref 45–117)
ALT SERPL-CCNC: 26 U/L (ref 13–61)
ANION GAP SERPL CALC-SCNC: 4 MMOL/L (ref 4–13)
AST SERPL-CCNC: 21 U/L (ref 15–37)
BILIRUB SERPL-MCNC: 0.2 MG/DL (ref 0.2–1)
BUN SERPL-MCNC: 13 MG/DL (ref 7–18)
CALCIUM SERPL-MCNC: 8.5 MG/DL (ref 8.5–10.1)
CHLORIDE SERPL-SCNC: 109 MMOL/L (ref 98–107)
CO2 SERPL-SCNC: 29 MMOL/L (ref 21–32)
CREAT SERPL-MCNC: 1 MG/DL (ref 0.55–1.3)
DEPRECATED RDW RBC AUTO: 14.9 % (ref 11.9–15.9)
GLUCOSE SERPL-MCNC: 86 MG/DL (ref 74–106)
HCT VFR BLD CALC: 41 % (ref 35.4–49)
HGB BLD-MCNC: 13.5 GM/DL (ref 11.7–16.9)
MCH RBC QN AUTO: 30.9 PG (ref 25.7–33.7)
MCHC RBC AUTO-ENTMCNC: 32.9 G/DL (ref 32–35.9)
MCV RBC: 93.8 FL (ref 80–96)
PLATELET # BLD AUTO: 274 10^3/UL (ref 134–434)
PMV BLD: 9.6 FL (ref 7.5–11.1)
POTASSIUM SERPLBLD-SCNC: 4 MMOL/L (ref 3.5–5.1)
PROT SERPL-MCNC: 6.4 G/DL (ref 6.4–8.2)
RBC # BLD AUTO: 4.37 M/MM3 (ref 4–5.6)
SODIUM SERPL-SCNC: 142 MMOL/L (ref 136–145)
WBC # BLD AUTO: 5.4 K/MM3 (ref 4–10)

## 2024-04-11 PROCEDURE — HZ42ZZZ GROUP COUNSELING FOR SUBSTANCE ABUSE TREATMENT, COGNITIVE-BEHAVIORAL: ICD-10-PCS | Performed by: PSYCHIATRY & NEUROLOGY

## 2024-04-11 RX ADMIN — Medication SCH MG: at 21:13

## 2024-04-11 RX ADMIN — Medication SCH TAB: at 09:56

## 2024-04-11 RX ADMIN — DIVALPROEX SODIUM SCH MG: 500 TABLET, DELAYED RELEASE ORAL at 21:13

## 2024-04-12 RX ADMIN — ALUMINUM HYDROXIDE, MAGNESIUM HYDROXIDE, AND SIMETHICONE PRN ML: 200; 200; 20 SUSPENSION ORAL at 03:21

## 2024-04-12 RX ADMIN — Medication SCH TAB: at 09:51

## 2024-04-12 RX ADMIN — IBUPROFEN PRN MG: 600 TABLET, FILM COATED ORAL at 03:20

## 2024-04-15 RX ADMIN — IBUPROFEN PRN MG: 400 TABLET, FILM COATED ORAL at 21:29

## 2024-04-19 RX ADMIN — BACLOFEN SCH MG: 10 TABLET ORAL at 17:15

## 2024-04-19 RX ADMIN — AMLODIPINE BESYLATE SCH MG: 5 TABLET ORAL at 17:15

## 2024-04-30 VITALS — DIASTOLIC BLOOD PRESSURE: 79 MMHG | SYSTOLIC BLOOD PRESSURE: 113 MMHG | HEART RATE: 81 BPM

## 2024-04-30 VITALS — TEMPERATURE: 97.3 F | RESPIRATION RATE: 18 BRPM

## 2024-10-05 ENCOUNTER — HOSPITAL ENCOUNTER (INPATIENT)
Dept: HOSPITAL 74 - YASAS | Age: 64
LOS: 5 days | Discharge: OTHER INSTITUTION | DRG: 897 | End: 2024-10-10
Attending: SURGERY | Admitting: SURGERY
Payer: COMMERCIAL

## 2024-10-05 VITALS — BODY MASS INDEX: 28.7 KG/M2

## 2024-10-05 DIAGNOSIS — Z62.810: ICD-10-CM

## 2024-10-05 DIAGNOSIS — N40.0: ICD-10-CM

## 2024-10-05 DIAGNOSIS — F14.20: ICD-10-CM

## 2024-10-05 DIAGNOSIS — F10.230: Primary | ICD-10-CM

## 2024-10-05 DIAGNOSIS — F12.20: ICD-10-CM

## 2024-10-05 DIAGNOSIS — Z86.11: ICD-10-CM

## 2024-10-05 DIAGNOSIS — F17.210: ICD-10-CM

## 2024-10-05 DIAGNOSIS — J45.909: ICD-10-CM

## 2024-10-05 DIAGNOSIS — I10: ICD-10-CM

## 2024-10-05 DIAGNOSIS — F31.9: ICD-10-CM

## 2024-10-05 DIAGNOSIS — E78.5: ICD-10-CM

## 2024-10-05 PROCEDURE — HZ2ZZZZ DETOXIFICATION SERVICES FOR SUBSTANCE ABUSE TREATMENT: ICD-10-PCS | Performed by: SURGERY

## 2024-10-05 RX ADMIN — Medication SCH MG: at 22:11

## 2024-10-05 RX ADMIN — NALOXONE HYDROCHLORIDE ONE: 4 SPRAY NASAL at 14:42

## 2024-10-05 RX ADMIN — AMLODIPINE BESYLATE SCH MG: 10 TABLET ORAL at 15:04

## 2024-10-05 RX ADMIN — METHOCARBAMOL PRN MG: 500 TABLET ORAL at 22:13

## 2024-10-05 RX ADMIN — ATORVASTATIN CALCIUM SCH MG: 10 TABLET, FILM COATED ORAL at 22:12

## 2024-10-06 LAB
ALBUMIN SERPL-MCNC: 3.3 G/DL (ref 3.4–5)
ALP SERPL-CCNC: 82 U/L (ref 45–117)
ALT SERPL-CCNC: 21 U/L (ref 13–61)
ANION GAP SERPL CALC-SCNC: 5 MMOL/L (ref 4–13)
AST SERPL-CCNC: 28 U/L (ref 15–37)
BILIRUB SERPL-MCNC: 0.8 MG/DL (ref 0.2–1)
BUN SERPL-MCNC: 16.2 MG/DL (ref 7–18)
CALCIUM SERPL-MCNC: 8.7 MG/DL (ref 8.5–10.1)
CHLORIDE SERPL-SCNC: 106 MMOL/L (ref 98–107)
CO2 SERPL-SCNC: 29 MMOL/L (ref 21–32)
CREAT SERPL-MCNC: 1.2 MG/DL (ref 0.55–1.3)
DEPRECATED RDW RBC AUTO: 15.3 % (ref 11.9–15.9)
GLUCOSE SERPL-MCNC: 79 MG/DL (ref 74–106)
HCT VFR BLD CALC: 42.7 % (ref 35.4–49)
HGB BLD-MCNC: 13.7 GM/DL (ref 11.7–16.9)
MCH RBC QN AUTO: 30.1 PG (ref 25.7–33.7)
MCHC RBC AUTO-ENTMCNC: 32.2 G/DL (ref 32–35.9)
MCV RBC: 93.6 FL (ref 80–96)
PLATELET # BLD AUTO: 263 10^3/UL (ref 134–434)
PMV BLD: 10.1 FL (ref 7.5–11.1)
POTASSIUM SERPLBLD-SCNC: 4.4 MMOL/L (ref 3.5–5.1)
PROT SERPL-MCNC: 6.7 G/DL (ref 6.4–8.2)
RBC # BLD AUTO: 4.56 M/MM3 (ref 4–5.6)
SODIUM SERPL-SCNC: 141 MMOL/L (ref 136–145)
WBC # BLD AUTO: 4.2 K/MM3 (ref 4–10)

## 2024-10-06 RX ADMIN — Medication SCH TAB: at 10:52

## 2024-10-06 RX ADMIN — TAMSULOSIN HYDROCHLORIDE SCH MG: 0.4 CAPSULE ORAL at 10:53

## 2024-10-06 RX ADMIN — DIVALPROEX SODIUM SCH MG: 500 TABLET, DELAYED RELEASE ORAL at 22:08

## 2024-10-07 RX ADMIN — ACETAMINOPHEN PRN MG: 325 TABLET ORAL at 04:36

## 2024-10-10 ENCOUNTER — HOSPITAL ENCOUNTER (INPATIENT)
Dept: HOSPITAL 74 - YASAS | Age: 64
LOS: 6 days | Discharge: HOME | DRG: 895 | End: 2024-10-16
Attending: PSYCHIATRY & NEUROLOGY | Admitting: PSYCHIATRY & NEUROLOGY
Payer: COMMERCIAL

## 2024-10-10 VITALS — SYSTOLIC BLOOD PRESSURE: 130 MMHG | HEART RATE: 73 BPM | DIASTOLIC BLOOD PRESSURE: 91 MMHG | RESPIRATION RATE: 18 BRPM

## 2024-10-10 VITALS — TEMPERATURE: 97.6 F

## 2024-10-10 DIAGNOSIS — F10.20: Primary | ICD-10-CM

## 2024-10-10 DIAGNOSIS — F19.282: ICD-10-CM

## 2024-10-10 DIAGNOSIS — N40.1: ICD-10-CM

## 2024-10-10 DIAGNOSIS — F41.9: ICD-10-CM

## 2024-10-10 DIAGNOSIS — R35.1: ICD-10-CM

## 2024-10-10 DIAGNOSIS — F31.9: ICD-10-CM

## 2024-10-10 DIAGNOSIS — Z86.11: ICD-10-CM

## 2024-10-10 DIAGNOSIS — F14.20: ICD-10-CM

## 2024-10-10 DIAGNOSIS — I10: ICD-10-CM

## 2024-10-10 DIAGNOSIS — F19.24: ICD-10-CM

## 2024-10-10 DIAGNOSIS — E78.5: ICD-10-CM

## 2024-10-10 DIAGNOSIS — F17.210: ICD-10-CM

## 2024-10-10 DIAGNOSIS — F12.20: ICD-10-CM

## 2024-10-10 PROCEDURE — G0009 ADMIN PNEUMOCOCCAL VACCINE: HCPCS

## 2024-10-10 PROCEDURE — HZ42ZZZ GROUP COUNSELING FOR SUBSTANCE ABUSE TREATMENT, COGNITIVE-BEHAVIORAL: ICD-10-PCS | Performed by: PSYCHIATRY & NEUROLOGY

## 2024-10-10 RX ADMIN — Medication SCH: at 12:59

## 2024-10-10 RX ADMIN — Medication SCH MG: at 21:11

## 2024-10-11 LAB
HIV 1+2 AB+HIV1 P24 AG SERPL QL IA: NEGATIVE
INR BLD: 0.93 (ref 0.83–1.09)
PT PNL PPP: 10.5 SEC (ref 9.7–13)

## 2024-10-11 RX ADMIN — NICOTINE SCH: 14 PATCH, EXTENDED RELEASE TRANSDERMAL at 10:50

## 2024-10-11 RX ADMIN — PNEUMOCOCCAL 20-VALENT CONJUGATE VACCINE ONE ML
2.2; 2.2; 2.2; 2.2; 2.2; 2.2; 2.2; 2.2; 2.2; 2.2; 2.2; 2.2; 2.2; 2.2; 2.2; 2.2; 4.4; 2.2; 2.2; 2.2 INJECTION, SUSPENSION INTRAMUSCULAR at 12:40

## 2024-10-12 RX ADMIN — ACETAMINOPHEN PRN MG: 325 TABLET ORAL at 01:48

## 2024-10-12 RX ADMIN — HYDROXYZINE PAMOATE PRN MG: 25 CAPSULE ORAL at 21:12

## 2024-10-15 VITALS — RESPIRATION RATE: 18 BRPM

## 2024-10-16 VITALS — DIASTOLIC BLOOD PRESSURE: 95 MMHG | HEART RATE: 61 BPM | SYSTOLIC BLOOD PRESSURE: 149 MMHG | TEMPERATURE: 97.3 F
